# Patient Record
Sex: MALE | URBAN - METROPOLITAN AREA
[De-identification: names, ages, dates, MRNs, and addresses within clinical notes are randomized per-mention and may not be internally consistent; named-entity substitution may affect disease eponyms.]

---

## 2021-12-23 ENCOUNTER — APPOINTMENT (RX ONLY)
Dept: URBAN - METROPOLITAN AREA CLINIC 69 | Facility: CLINIC | Age: 66
Setting detail: DERMATOLOGY
End: 2021-12-23

## 2021-12-23 DIAGNOSIS — L81.4 OTHER MELANIN HYPERPIGMENTATION: ICD-10-CM

## 2021-12-23 DIAGNOSIS — L82.0 INFLAMED SEBORRHEIC KERATOSIS: ICD-10-CM

## 2021-12-23 DIAGNOSIS — Z71.89 OTHER SPECIFIED COUNSELING: ICD-10-CM

## 2021-12-23 DIAGNOSIS — D18.0 HEMANGIOMA: ICD-10-CM

## 2021-12-23 DIAGNOSIS — L91.8 OTHER HYPERTROPHIC DISORDERS OF THE SKIN: ICD-10-CM

## 2021-12-23 PROBLEM — D18.01 HEMANGIOMA OF SKIN AND SUBCUTANEOUS TISSUE: Status: ACTIVE | Noted: 2021-12-23

## 2021-12-23 PROCEDURE — ? LIQUID NITROGEN

## 2021-12-23 PROCEDURE — 17110 DESTRUCTION B9 LES UP TO 14: CPT

## 2021-12-23 PROCEDURE — 99203 OFFICE O/P NEW LOW 30 MIN: CPT | Mod: 25

## 2021-12-23 PROCEDURE — ? COUNSELING

## 2021-12-23 PROCEDURE — ? SUNSCREEN RECOMMENDATIONS

## 2021-12-23 ASSESSMENT — LOCATION SIMPLE DESCRIPTION DERM
LOCATION SIMPLE: LEFT UPPER ARM
LOCATION SIMPLE: LEFT CALF
LOCATION SIMPLE: RIGHT INFERIOR EYELID
LOCATION SIMPLE: LEFT SHOULDER
LOCATION SIMPLE: LEFT HAND

## 2021-12-23 ASSESSMENT — LOCATION ZONE DERM
LOCATION ZONE: LEG
LOCATION ZONE: ARM
LOCATION ZONE: EYELID
LOCATION ZONE: HAND

## 2021-12-23 ASSESSMENT — LOCATION DETAILED DESCRIPTION DERM
LOCATION DETAILED: LEFT RADIAL DORSAL HAND
LOCATION DETAILED: RIGHT LATERAL INFERIOR EYELID
LOCATION DETAILED: LEFT POSTERIOR SHOULDER
LOCATION DETAILED: LEFT DISTAL CALF
LOCATION DETAILED: LEFT ANTERIOR DISTAL UPPER ARM

## 2021-12-23 NOTE — PROCEDURE: LIQUID NITROGEN
Render Note In Bullet Format When Appropriate: No
Consent: The patient's consent was obtained including but not limited to risks of crusting, scabbing, blistering.
Detail Level: Detailed
Medical Necessity Information: It is in your best interest to select a reason for this procedure from the list below. All of these items fulfill various CMS LCD requirements except the new and changing color options.
Show Topical Anesthesia Variable?: Yes
Post-Care Instructions: I reviewed with the patient in detail post-care instructions. Patient is to wear sunprotection, and avoid picking at any of the treated lesions. Pt may apply Vaseline to crusted or scabbing areas.
Medical Necessity Clause: This procedure was medically necessary because the lesions that were treated were: